# Patient Record
Sex: MALE
[De-identification: names, ages, dates, MRNs, and addresses within clinical notes are randomized per-mention and may not be internally consistent; named-entity substitution may affect disease eponyms.]

---

## 2017-12-27 ENCOUNTER — HOSPITAL ENCOUNTER (OUTPATIENT)
Dept: HOSPITAL 92 - ERS | Age: 82
Setting detail: OBSERVATION
LOS: 1 days | Discharge: HOME | End: 2017-12-28
Attending: FAMILY MEDICINE | Admitting: FAMILY MEDICINE
Payer: MEDICARE

## 2017-12-27 VITALS — BODY MASS INDEX: 25.7 KG/M2

## 2017-12-27 DIAGNOSIS — Z79.82: ICD-10-CM

## 2017-12-27 DIAGNOSIS — E11.9: ICD-10-CM

## 2017-12-27 DIAGNOSIS — Z95.1: ICD-10-CM

## 2017-12-27 DIAGNOSIS — Z88.5: ICD-10-CM

## 2017-12-27 DIAGNOSIS — I25.10: ICD-10-CM

## 2017-12-27 DIAGNOSIS — Z88.6: ICD-10-CM

## 2017-12-27 DIAGNOSIS — Z79.84: ICD-10-CM

## 2017-12-27 DIAGNOSIS — Z79.899: ICD-10-CM

## 2017-12-27 DIAGNOSIS — R07.89: Primary | ICD-10-CM

## 2017-12-27 DIAGNOSIS — Z88.8: ICD-10-CM

## 2017-12-27 DIAGNOSIS — I10: ICD-10-CM

## 2017-12-27 DIAGNOSIS — Z88.1: ICD-10-CM

## 2017-12-27 LAB
ALBUMIN SERPL BCG-MCNC: 4.2 G/DL (ref 3.4–4.8)
ALP SERPL-CCNC: 119 U/L (ref 40–150)
ALT SERPL W P-5'-P-CCNC: 16 U/L (ref 8–55)
ANION GAP SERPL CALC-SCNC: 13 MMOL/L (ref 10–20)
AST SERPL-CCNC: 16 U/L (ref 5–34)
BASOPHILS # BLD AUTO: 0.1 THOU/UL (ref 0–0.2)
BASOPHILS NFR BLD AUTO: 0.7 % (ref 0–1)
BILIRUB SERPL-MCNC: 0.2 MG/DL (ref 0.2–1.2)
BUN SERPL-MCNC: 24 MG/DL (ref 8.4–25.7)
CALCIUM SERPL-MCNC: 9.8 MG/DL (ref 7.8–10.44)
CHLORIDE SERPL-SCNC: 102 MMOL/L (ref 98–107)
CK MB SERPL-MCNC: 1.5 NG/ML (ref 0–6.6)
CO2 SERPL-SCNC: 26 MMOL/L (ref 23–31)
CREAT CL PREDICTED SERPL C-G-VRATE: 0 ML/MIN (ref 70–130)
EOSINOPHIL # BLD AUTO: 0.2 THOU/UL (ref 0–0.7)
EOSINOPHIL NFR BLD AUTO: 2.4 % (ref 0–10)
GLOBULIN SER CALC-MCNC: 3.7 G/DL (ref 2.4–3.5)
GLUCOSE SERPL-MCNC: 188 MG/DL (ref 83–110)
HGB BLD-MCNC: 13.5 G/DL (ref 14–18)
LYMPHOCYTES # BLD: 2.9 THOU/UL (ref 1.2–3.4)
LYMPHOCYTES NFR BLD AUTO: 33 % (ref 21–51)
MCH RBC QN AUTO: 33.1 PG (ref 27–31)
MCV RBC AUTO: 98.1 FL (ref 80–94)
MONOCYTES # BLD AUTO: 1 THOU/UL (ref 0.11–0.59)
MONOCYTES NFR BLD AUTO: 11.7 % (ref 0–10)
NEUTROPHILS # BLD AUTO: 4.5 THOU/UL (ref 1.4–6.5)
NEUTROPHILS NFR BLD AUTO: 52.2 % (ref 42–75)
PLATELET # BLD AUTO: 198 THOU/UL (ref 130–400)
POTASSIUM SERPL-SCNC: 4.2 MMOL/L (ref 3.5–5.1)
RBC # BLD AUTO: 4.09 MILL/UL (ref 4.7–6.1)
SODIUM SERPL-SCNC: 137 MMOL/L (ref 136–145)
TROPONIN I SERPL DL<=0.01 NG/ML-MCNC: (no result) NG/ML (ref ?–0.03)
WBC # BLD AUTO: 8.7 THOU/UL (ref 4.8–10.8)

## 2017-12-27 PROCEDURE — 71020: CPT

## 2017-12-27 PROCEDURE — 93005 ELECTROCARDIOGRAM TRACING: CPT

## 2017-12-27 PROCEDURE — 82962 GLUCOSE BLOOD TEST: CPT

## 2017-12-27 PROCEDURE — 78452 HT MUSCLE IMAGE SPECT MULT: CPT

## 2017-12-27 PROCEDURE — 93017 CV STRESS TEST TRACING ONLY: CPT

## 2017-12-27 PROCEDURE — A9500 TC99M SESTAMIBI: HCPCS

## 2017-12-27 PROCEDURE — 36415 COLL VENOUS BLD VENIPUNCTURE: CPT

## 2017-12-27 PROCEDURE — 80053 COMPREHEN METABOLIC PANEL: CPT

## 2017-12-27 PROCEDURE — 99285 EMERGENCY DEPT VISIT HI MDM: CPT

## 2017-12-27 PROCEDURE — G0378 HOSPITAL OBSERVATION PER HR: HCPCS

## 2017-12-27 PROCEDURE — 85025 COMPLETE CBC W/AUTO DIFF WBC: CPT

## 2017-12-27 PROCEDURE — 82553 CREATINE MB FRACTION: CPT

## 2017-12-27 PROCEDURE — 84484 ASSAY OF TROPONIN QUANT: CPT

## 2017-12-27 PROCEDURE — 36416 COLLJ CAPILLARY BLOOD SPEC: CPT

## 2017-12-27 NOTE — RAD
CHEST TWO VIEWS:

12/27/17

 

HISTORY: 

Chest pain.

 

COMPARISON:  

9/19/17 study. 

 

Heart size is upper limits of normal. Postop sternotomy change. The lungs are clear of any infiltrati
ve process. 

 

IMPRESSION:  

Stable chest. No active intrathoracic disease.

 

POS: [facility]

## 2017-12-28 VITALS — TEMPERATURE: 97.8 F | DIASTOLIC BLOOD PRESSURE: 75 MMHG | SYSTOLIC BLOOD PRESSURE: 142 MMHG

## 2017-12-28 LAB
TROPONIN I SERPL DL<=0.01 NG/ML-MCNC: (no result) NG/ML (ref ?–0.03)
TROPONIN I SERPL DL<=0.01 NG/ML-MCNC: (no result) NG/ML (ref ?–0.03)

## 2017-12-28 NOTE — HP
PRIMARY CARE PROVIDER:  Listed as Dr. Mane Emery.  He cannot remember the name of the doctor, bu
sony that is what is listed.

 

Referred to [facility] by [facility].

 

HISTORY OF PRESENT ILLNESS:  The patient states his blood pressure got high about 159.  He felt a pre
ssure in his chest, lasted about 2 hours, no radiation, no associated symptoms of sweats, shortness o
f breath or nausea.  He presented to the emergency room and was admitted.

 

PAST MEDICAL HISTORY:  Coronary artery disease, diabetes mellitus type 2, hypertension.

 

PAST SURGICAL HISTORY:  Coronary artery bypass graft about 4 years ago, cholecystectomy.

 

ALLERGIES:  ACETAMINOPHEN, AMOXICILLIN, HYDROCODONE, NAPROXEN.

 

SOCIAL HISTORY:  No tobacco or alcohol.

 

CODE STATUS:  FULL CODE status.  Wife at bedside, next of kin and surrogate decision maker.  He is an
 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

 

FAMILY HISTORY:  No coronary artery disease, diabetes or hypertension.

 

CURRENT MEDICATIONS:  Metformin 500 mg a day, lisinopril 5 mg a day, Lipitor 10 mg a day, aspirin 325
 mg a day.

 

REVIEW OF SYSTEMS:  General:  No headaches, dizziness or fainting.  Eyes:  No double vision, blurred 
vision, flashing lights.  ENT:  No ear pain or drainage.  No nasal bleeding.  No trouble swallowing. 
 Cardiac:  No orthopnea or paroxysmal nocturnal dyspnea.  Respirations:  No cough, wheezing or asthma
.  Gastrointestinal:  No nausea, vomiting, diarrhea, constipation, abdominal pain, or melena.  Genito
urinary:  No hematuria, dysuria.  Musculoskeletal:  No pain or swelling in his arms or legs.  Neurolo
gic:  No strokes, seizures or focal weakness.  Psychiatric:  No anxiety, depression.  Skin:  No bruis
es, bleeding or rash.  Heme/Lymph:  No tender or swollen lymph nodes under his arms, neck or his groi
n.  No petechial hemorrhages have been noted.  No bleeding gums.

 

PHYSICAL EXAMINATION:

GENERAL:  He is an alert, pleasant, cooperative gentleman.

VITAL SIGNS:  Blood pressure 143//88, pulse 65-80, respirations 14-16, afebrile, O2 sat 95-98 o
n room air.

HEENT:  Reveal pupils equal, round, and reactive to light.  Extraocular movements are intact.  Sclera
e white.  Tympanic membranes clear.  Nose is clear.  Oral mucous membranes are wet.  Dental hygiene i
s good.

NECK:  Supple, without jugular venous distention, adenopathy or thyromegaly.

CHEST:  Clear to auscultation and percussion.

HEART:  Regular rate and rhythm.  First and second heart sounds clear.  No murmurs, no gallops.

ABDOMEN:  Soft, bowel sounds are normal.  No hepatosplenomegaly, no mass, no rebound, no bruits.

EXTREMITIES:  Reveal no cyanosis, clubbing or edema.

PULSES:  Carotid, radial, femoral, and dorsalis pedis pulses, brisk symmetric.

SKIN:  Warm and dry without bruises or rash.

HEME/LYMPH:  No tender or swollen lymph nodes in axilla, inguinal or cervical area.  No petechial reva
h.

NEUROLOGICAL:  Cranial nerves II through XII are intact.  Moves all extremities.  Sensation is intact
.

 

LABORATORY DATA AND X-RAY FINDINGS:  Comp metabolic profile normal.  Cardiac enzymes normal x3.  Bloo
d sugar 188, 114.  CBC:  Hemoglobin 13.5, white count 8.7, platelet count 198,000.  Chest x-ray revie
wed by me, no cardiomegaly, CHF or infiltrate, post-sternotomy changes.  EKG:  Regular sinus rhythm, 
right bundle branch block, reviewed by me.

 

ADMITTING PROBLEMS:

1.  Chest pain, tightness sensation consistent with angina.

2.  Coronary artery disease, post coronary artery bypass graft.

3.  Hypertension.

4.  Diabetes mellitus type 2 on oral hypoglycemic medicines.

 

PLAN:

1.  Hold metformin.

2.  Exercise Cardiolite stress test.

3.  Monitor blood pressure and Accu-Cheks.

4.  Reevaluate when stress test, etc. is available.

## 2017-12-28 NOTE — NM
CARDIAC SPECT:

 

HISTORY:

An 88-year-old male with chest pain.  Hypertension.  Coronary artery disease.  Diabetes.  CABG.

 

TECHNIQUE:

A myocardial perfusion scan was performed using the single isotope one-day protocol with technetium 9
9m sestamibi, and 9 millicuries was injected intravenously for the rest exam, followed by 30 millicur
ies for the stress study.  Exercise stress was monitored and interpreted by Dr. Gautam.

 

FINDINGS:

Homogeneous tracer distribution is seen in the myocardial segments on stress and rest images without 
fixed or reversible defects.

 

GATED SPECT LVEF:

70%

 

WALL MOTION EXAM:

Normal.

 

IMPRESSION:

Normal myocardial perfusion scan.

 

[facility]: [facility]

## 2017-12-28 NOTE — DIS
DATE OF ADMISSION:  12/28/2017

 

DATE OF DISCHARGE:  12/28/2017

 

DISCHARGE DISPOSITION:  Discharged home.

 

PRIMARY CARE PROVIDER:  Mane Emery D.O.

 

FINAL DIAGNOSES:  Chest pain, noncardiac; coronary artery disease; hypertension, diabetes mellitus ty
pe 2.

 

DISCHARGE MEDICATIONS:  Metformin 500 mg a day, lisinopril 5 mg a day, Lipitor 10 mg a day, aspirin 3
25 mg a day.

 

ALLERGIES:  AMOXICILLIN, HYDROCODONE, NAPROSYN.

 

CODE STATUS:  FULL.

 

PENDING AT THE TIME OF DISCHARGE:  Nothing.

 

HOSPITAL COURSE:  The patient admitted to the hospital through [facility] statin
g his blood pressure got high, he felt some tightness in his chest.  His chest x-ray was unrevealing 
for pneumonia, CHF, etc.  EKG was unrevealing for acute abnormality.  White count 8.7, hemoglobin 13.
5, platelet count 198,000.  Cardiac enzymes normal x3.  Comp metabolic profile normal except for bloo
d sugar of 188.  The patient underwent an exercise Cardiolite stress test which is normal.  The patie
nt currently feels well.  The cardiorespiratory exam is normal.  Vital signs were controlled, blood p
ressure 142/75, pulse 79, respirations 16, O2 sat 96 on room air.  The situation has been discussed w
ith him.  He is being discharged to follow up with Dr. Emery in 7 days.

 

CONSULTATIONS:  None.

 

PROCEDURES:  None.

## 2017-12-30 NOTE — EKG
Test Reason : CHEST PAIN

Blood Pressure : ***/*** mmHG

Vent. Rate : 074 BPM     Atrial Rate : 074 BPM

   P-R Int : 162 ms          QRS Dur : 138 ms

    QT Int : 408 ms       P-R-T Axes : 034 -24 024 degrees

   QTc Int : 452 ms

 

Normal sinus rhythm

Right bundle branch block

Abnormal ECG

 

Confirmed by ANDREW GARCIA ([id number]),  YAW ERWIN ([id number]) on 12/30/2017 1:27:30 PM

 

Referred By:             Confirmed By:ANDREW GARCIA

## 2018-04-06 ENCOUNTER — HOSPITAL ENCOUNTER (EMERGENCY)
Dept: HOSPITAL 92 - ERS | Age: 83
Discharge: HOME | End: 2018-04-06
Payer: MEDICARE

## 2018-04-06 DIAGNOSIS — I10: ICD-10-CM

## 2018-04-06 DIAGNOSIS — S29.011A: Primary | ICD-10-CM

## 2018-04-06 DIAGNOSIS — I25.10: ICD-10-CM

## 2018-04-06 DIAGNOSIS — Z79.84: ICD-10-CM

## 2018-04-06 DIAGNOSIS — Z79.82: ICD-10-CM

## 2018-04-06 DIAGNOSIS — E11.9: ICD-10-CM

## 2018-04-06 DIAGNOSIS — Z79.899: ICD-10-CM

## 2018-04-06 DIAGNOSIS — Z87.891: ICD-10-CM
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
## 2018-04-06 LAB
ALBUMIN SERPL BCG-MCNC: 3.9 G/DL (ref 3.4–4.8)
ALP SERPL-CCNC: 122 U/L (ref 40–150)
ALT SERPL W P-5'-P-CCNC: 14 U/L (ref 8–55)
ANION GAP SERPL CALC-SCNC: 14 MMOL/L (ref 10–20)
AST SERPL-CCNC: 13 U/L (ref 5–34)
BASOPHILS # BLD AUTO: 0 THOU/UL (ref 0–0.2)
BASOPHILS NFR BLD AUTO: 0.1 % (ref 0–1)
BILIRUB SERPL-MCNC: 0.4 MG/DL (ref 0.2–1.2)
BUN SERPL-MCNC: 20 MG/DL (ref 8.4–25.7)
CALCIUM SERPL-MCNC: 9.4 MG/DL (ref 7.8–10.44)
CHLORIDE SERPL-SCNC: 104 MMOL/L (ref 98–107)
CK MB SERPL-MCNC: 1.5 NG/ML (ref 0–6.6)
CK SERPL-CCNC: 74 U/L (ref 30–200)
CO2 SERPL-SCNC: 24 MMOL/L (ref 23–31)
CREAT CL PREDICTED SERPL C-G-VRATE: 0 ML/MIN (ref 70–130)
EOSINOPHIL # BLD AUTO: 0.2 THOU/UL (ref 0–0.7)
EOSINOPHIL NFR BLD AUTO: 1.9 % (ref 0–10)
GLOBULIN SER CALC-MCNC: 2.8 G/DL (ref 2.4–3.5)
GLUCOSE SERPL-MCNC: 182 MG/DL (ref 83–110)
HGB BLD-MCNC: 12.4 G/DL (ref 14–18)
LYMPHOCYTES # BLD: 2.3 THOU/UL (ref 1.2–3.4)
LYMPHOCYTES NFR BLD AUTO: 22.1 % (ref 21–51)
MCH RBC QN AUTO: 33 PG (ref 27–31)
MCV RBC AUTO: 95.5 FL (ref 80–94)
MONOCYTES # BLD AUTO: 0.9 THOU/UL (ref 0.11–0.59)
MONOCYTES NFR BLD AUTO: 8.7 % (ref 0–10)
NEUTROPHILS # BLD AUTO: 7 THOU/UL (ref 1.4–6.5)
NEUTROPHILS NFR BLD AUTO: 67.2 % (ref 42–75)
PLATELET # BLD AUTO: 187 THOU/UL (ref 130–400)
POTASSIUM SERPL-SCNC: 4.6 MMOL/L (ref 3.5–5.1)
RBC # BLD AUTO: 3.75 MILL/UL (ref 4.7–6.1)
SODIUM SERPL-SCNC: 137 MMOL/L (ref 136–145)
TROPONIN I SERPL DL<=0.01 NG/ML-MCNC: (no result) NG/ML (ref ?–0.03)
WBC # BLD AUTO: 10.4 THOU/UL (ref 4.8–10.8)

## 2018-04-06 PROCEDURE — 36415 COLL VENOUS BLD VENIPUNCTURE: CPT

## 2018-04-06 PROCEDURE — 80053 COMPREHEN METABOLIC PANEL: CPT

## 2018-04-06 PROCEDURE — 71046 X-RAY EXAM CHEST 2 VIEWS: CPT

## 2018-04-06 PROCEDURE — 82553 CREATINE MB FRACTION: CPT

## 2018-04-06 PROCEDURE — 85025 COMPLETE CBC W/AUTO DIFF WBC: CPT

## 2018-04-06 PROCEDURE — 93005 ELECTROCARDIOGRAM TRACING: CPT

## 2018-04-06 PROCEDURE — 94760 N-INVAS EAR/PLS OXIMETRY 1: CPT

## 2018-04-06 PROCEDURE — 84484 ASSAY OF TROPONIN QUANT: CPT

## 2018-04-06 NOTE — RAD
CHEST PA AND LATERAL:

 

Date:  04/06/18 

 

HISTORY:  

88-year-old male with history of chest pain, primarily left-sided. 

 

COMPARISON:  

12/27/17. 

 

FINDINGS:

Postop midline sternotomy. Mild stable chronic linear and interstitial changes. No confluent pneumoni
a, overt edema, or pleural effusion. 

 

IMPRESSION: 

Stable minimal bilateral chronic changes. No acute intrathoracic disease. 

 

 

POS: [facility]

## 2018-05-04 ENCOUNTER — HOSPITAL ENCOUNTER (EMERGENCY)
Dept: HOSPITAL 92 - ERS | Age: 83
Discharge: HOME | End: 2018-05-04
Payer: MEDICARE

## 2018-05-04 DIAGNOSIS — Z79.899: ICD-10-CM

## 2018-05-04 DIAGNOSIS — I25.10: ICD-10-CM

## 2018-05-04 DIAGNOSIS — Z79.84: ICD-10-CM

## 2018-05-04 DIAGNOSIS — I10: ICD-10-CM

## 2018-05-04 DIAGNOSIS — Z87.891: ICD-10-CM

## 2018-05-04 DIAGNOSIS — J06.9: Primary | ICD-10-CM

## 2018-05-04 DIAGNOSIS — E11.9: ICD-10-CM

## 2018-05-04 DIAGNOSIS — E78.00: ICD-10-CM

## 2018-05-04 DIAGNOSIS — Z79.82: ICD-10-CM

## 2018-05-04 PROCEDURE — 96372 THER/PROPH/DIAG INJ SC/IM: CPT

## 2018-05-04 PROCEDURE — 87804 INFLUENZA ASSAY W/OPTIC: CPT

## 2018-05-04 PROCEDURE — 71046 X-RAY EXAM CHEST 2 VIEWS: CPT

## 2018-05-04 NOTE — RAD
PA AND LATERAL VIEWS CHEST:

 

HISTORY: 

Cough.

 

COMPARISON: 

Comparison is made with the exam of 4/6/18.

 

FINDINGS: 

Postop changes of median sternotomy are again seen.  The heart size is normal.  Chronic changes are r
edemonstrated in the lung fields.  No lobar consolidation, pneumothoraces, or pleural effusions are i
dentified.  There are degenerative changes in the spine.

 

IMPRESSION: 

No acute process.

 

POS: [facility]

## 2018-05-13 ENCOUNTER — HOSPITAL ENCOUNTER (EMERGENCY)
Dept: HOSPITAL 92 - ERS | Age: 83
Discharge: HOME | End: 2018-05-13
Payer: MEDICARE

## 2018-05-13 DIAGNOSIS — E11.9: ICD-10-CM

## 2018-05-13 DIAGNOSIS — Z87.891: ICD-10-CM

## 2018-05-13 DIAGNOSIS — I10: ICD-10-CM

## 2018-05-13 DIAGNOSIS — I25.10: ICD-10-CM

## 2018-05-13 DIAGNOSIS — J40: Primary | ICD-10-CM

## 2018-05-13 PROCEDURE — 71046 X-RAY EXAM CHEST 2 VIEWS: CPT

## 2018-05-13 PROCEDURE — 96372 THER/PROPH/DIAG INJ SC/IM: CPT

## 2018-05-13 NOTE — RAD
PA AND LATERAL CHEST:

 

Date:  05/13/18 

 

HISTORY:  

Cough and congestion for several weeks. 

 

COMPARISON:  

05/04/18. 

 

FINDINGS:

Postsurgical changes related to CABG are again noted. The cardiac silhouette is at the upper limits o
f normal but stable in size. Pulmonary vasculature is within normal limits. Mild linear densities are
 again seen at each lung base, probably related to superimposition of structures and vasculature. Rosa
gs are otherwise clear. Degenerative changes seen in the spine. Surgical clips overlie the right uppe
r quadrant. 

 

IMPRESSION: 

No acute cardiopulmonary process. 

 

 

POS: [facility]

## 2018-05-20 ENCOUNTER — HOSPITAL ENCOUNTER (EMERGENCY)
Dept: HOSPITAL 92 - ERS | Age: 83
Discharge: HOME | End: 2018-05-20
Payer: MEDICARE

## 2018-05-20 DIAGNOSIS — I25.10: ICD-10-CM

## 2018-05-20 DIAGNOSIS — I10: ICD-10-CM

## 2018-05-20 DIAGNOSIS — J06.9: Primary | ICD-10-CM

## 2018-05-20 DIAGNOSIS — Z79.82: ICD-10-CM

## 2018-05-20 DIAGNOSIS — Z79.899: ICD-10-CM

## 2018-05-20 DIAGNOSIS — Z79.84: ICD-10-CM

## 2018-05-20 DIAGNOSIS — Z87.891: ICD-10-CM

## 2018-05-20 DIAGNOSIS — E78.5: ICD-10-CM

## 2018-05-20 DIAGNOSIS — E11.9: ICD-10-CM

## 2018-05-20 LAB
ALBUMIN SERPL BCG-MCNC: 3.7 G/DL (ref 3.4–4.8)
ALP SERPL-CCNC: 85 U/L (ref 40–150)
ALT SERPL W P-5'-P-CCNC: 15 U/L (ref 8–55)
ANION GAP SERPL CALC-SCNC: 9 MMOL/L (ref 10–20)
AST SERPL-CCNC: 12 U/L (ref 5–34)
BASOPHILS # BLD AUTO: 0.1 THOU/UL (ref 0–0.2)
BASOPHILS NFR BLD AUTO: 0.8 % (ref 0–1)
BILIRUB SERPL-MCNC: 0.6 MG/DL (ref 0.2–1.2)
BUN SERPL-MCNC: 34 MG/DL (ref 8.4–25.7)
CALCIUM SERPL-MCNC: 9.5 MG/DL (ref 7.8–10.44)
CHLORIDE SERPL-SCNC: 103 MMOL/L (ref 98–107)
CK MB SERPL-MCNC: 1.1 NG/ML (ref 0–6.6)
CK SERPL-CCNC: 35 U/L (ref 30–200)
CO2 SERPL-SCNC: 26 MMOL/L (ref 23–31)
CREAT CL PREDICTED SERPL C-G-VRATE: 0 ML/MIN (ref 70–130)
EOSINOPHIL # BLD AUTO: 0.3 THOU/UL (ref 0–0.7)
EOSINOPHIL NFR BLD AUTO: 2.3 % (ref 0–10)
GLOBULIN SER CALC-MCNC: 2.9 G/DL (ref 2.4–3.5)
GLUCOSE SERPL-MCNC: 215 MG/DL (ref 83–110)
HGB BLD-MCNC: 13.3 G/DL (ref 14–18)
LYMPHOCYTES # BLD: 3.2 THOU/UL (ref 1.2–3.4)
LYMPHOCYTES NFR BLD AUTO: 26.1 % (ref 21–51)
MCH RBC QN AUTO: 33 PG (ref 27–31)
MCV RBC AUTO: 95.5 FL (ref 80–94)
MONOCYTES # BLD AUTO: 0.9 THOU/UL (ref 0.11–0.59)
MONOCYTES NFR BLD AUTO: 7.6 % (ref 0–10)
NEUTROPHILS # BLD AUTO: 7.7 THOU/UL (ref 1.4–6.5)
NEUTROPHILS NFR BLD AUTO: 63.3 % (ref 42–75)
PLATELET # BLD AUTO: 254 THOU/UL (ref 130–400)
POTASSIUM SERPL-SCNC: 4.4 MMOL/L (ref 3.5–5.1)
RBC # BLD AUTO: 4.03 MILL/UL (ref 4.7–6.1)
SODIUM SERPL-SCNC: 134 MMOL/L (ref 136–145)
TROPONIN I SERPL DL<=0.01 NG/ML-MCNC: (no result) NG/ML (ref ?–0.03)
WBC # BLD AUTO: 12.2 THOU/UL (ref 4.8–10.8)

## 2018-05-20 PROCEDURE — 80053 COMPREHEN METABOLIC PANEL: CPT

## 2018-05-20 PROCEDURE — 93005 ELECTROCARDIOGRAM TRACING: CPT

## 2018-05-20 PROCEDURE — 71045 X-RAY EXAM CHEST 1 VIEW: CPT

## 2018-05-20 PROCEDURE — 82553 CREATINE MB FRACTION: CPT

## 2018-05-20 PROCEDURE — 36415 COLL VENOUS BLD VENIPUNCTURE: CPT

## 2018-05-20 PROCEDURE — 84484 ASSAY OF TROPONIN QUANT: CPT

## 2018-05-20 PROCEDURE — 85025 COMPLETE CBC W/AUTO DIFF WBC: CPT

## 2018-05-20 NOTE — RAD
PORTABLE CHEST:

 

Date:  05/20/18 

 

HISTORY:  

Shortness of breath. 

 

COMPARISON:  

03/13/16 study. 

 

FINDINGS:

Heart size is within normal limits. There are postop sternotomy changes. Lungs are clear of infiltrat
es. Mild chronic changes are seen. No focal infiltrates. 

 

IMPRESSION: 

No active intrathoracic disease. 

 

 

POS: [facility]

## 2019-01-27 ENCOUNTER — HOSPITAL ENCOUNTER (EMERGENCY)
Dept: HOSPITAL 92 - ERS | Age: 84
Discharge: HOME | End: 2019-01-27
Payer: MEDICARE

## 2019-01-27 DIAGNOSIS — Z79.84: ICD-10-CM

## 2019-01-27 DIAGNOSIS — Z79.82: ICD-10-CM

## 2019-01-27 DIAGNOSIS — E11.9: ICD-10-CM

## 2019-01-27 DIAGNOSIS — Z87.891: ICD-10-CM

## 2019-01-27 DIAGNOSIS — I25.10: ICD-10-CM

## 2019-01-27 DIAGNOSIS — R05: Primary | ICD-10-CM

## 2019-01-27 DIAGNOSIS — Z79.899: ICD-10-CM

## 2019-01-27 DIAGNOSIS — I10: ICD-10-CM

## 2019-01-27 PROCEDURE — 71046 X-RAY EXAM CHEST 2 VIEWS: CPT

## 2019-01-27 NOTE — RAD
RADIOGRAPH CHEST 2 VIEWS:

 

HISTORY: 

An 89-year-old male with cough for 3 days.

 

FINDINGS:

There is no air space density, pulmonary edema, pleural effusion, pneumothorax, or cardiomegaly.

 

IMPRESSION: 

No acute cardiopulmonary findings.

 

 

jn []

 

POS: [facility]

## 2019-06-28 ENCOUNTER — HOSPITAL ENCOUNTER (OUTPATIENT)
Dept: HOSPITAL 92 - ERS | Age: 84
Setting detail: OBSERVATION
LOS: 1 days | Discharge: HOME | End: 2019-06-29
Attending: INTERNAL MEDICINE | Admitting: INTERNAL MEDICINE
Payer: MEDICARE

## 2019-06-28 VITALS — BODY MASS INDEX: 24.8 KG/M2

## 2019-06-28 DIAGNOSIS — E86.0: Primary | ICD-10-CM

## 2019-06-28 DIAGNOSIS — I25.10: ICD-10-CM

## 2019-06-28 DIAGNOSIS — Z88.0: ICD-10-CM

## 2019-06-28 DIAGNOSIS — E11.9: ICD-10-CM

## 2019-06-28 DIAGNOSIS — Z95.1: ICD-10-CM

## 2019-06-28 DIAGNOSIS — I10: ICD-10-CM

## 2019-06-28 DIAGNOSIS — Z88.6: ICD-10-CM

## 2019-06-28 DIAGNOSIS — Z79.82: ICD-10-CM

## 2019-06-28 DIAGNOSIS — E78.5: ICD-10-CM

## 2019-06-28 DIAGNOSIS — Z88.5: ICD-10-CM

## 2019-06-28 DIAGNOSIS — Z79.899: ICD-10-CM

## 2019-06-28 DIAGNOSIS — N17.9: ICD-10-CM

## 2019-06-28 DIAGNOSIS — Z79.84: ICD-10-CM

## 2019-06-28 DIAGNOSIS — Z87.891: ICD-10-CM

## 2019-06-28 LAB
ALBUMIN SERPL BCG-MCNC: 4.7 G/DL (ref 3.4–4.8)
ALP SERPL-CCNC: 86 U/L (ref 40–150)
ALT SERPL W P-5'-P-CCNC: 19 U/L (ref 8–55)
ANION GAP SERPL CALC-SCNC: 18 MMOL/L (ref 10–20)
AST SERPL-CCNC: 14 U/L (ref 5–34)
BASOPHILS # BLD AUTO: 0.1 THOU/UL (ref 0–0.2)
BASOPHILS NFR BLD AUTO: 0.4 % (ref 0–1)
BILIRUB SERPL-MCNC: 0.5 MG/DL (ref 0.2–1.2)
BUN SERPL-MCNC: 47 MG/DL (ref 8.4–25.7)
CALCIUM SERPL-MCNC: 10.7 MG/DL (ref 7.8–10.44)
CHLORIDE SERPL-SCNC: 102 MMOL/L (ref 98–107)
CK SERPL-CCNC: 149 U/L (ref 30–200)
CO2 SERPL-SCNC: 21 MMOL/L (ref 23–31)
CREAT CL PREDICTED SERPL C-G-VRATE: 0 ML/MIN (ref 70–130)
EOSINOPHIL # BLD AUTO: 0.1 THOU/UL (ref 0–0.7)
EOSINOPHIL NFR BLD AUTO: 0.7 % (ref 0–10)
GLOBULIN SER CALC-MCNC: 3.4 G/DL (ref 2.4–3.5)
GLUCOSE SERPL-MCNC: 146 MG/DL (ref 83–110)
HGB BLD-MCNC: 13.2 G/DL (ref 14–18)
LYMPHOCYTES # BLD: 2.4 THOU/UL (ref 1.2–3.4)
LYMPHOCYTES NFR BLD AUTO: 17.5 % (ref 21–51)
MCH RBC QN AUTO: 32 PG (ref 27–31)
MCV RBC AUTO: 95.4 FL (ref 78–98)
MONOCYTES # BLD AUTO: 1.6 THOU/UL (ref 0.11–0.59)
MONOCYTES NFR BLD AUTO: 12 % (ref 0–10)
NEUTROPHILS # BLD AUTO: 9.4 THOU/UL (ref 1.4–6.5)
NEUTROPHILS NFR BLD AUTO: 69.4 % (ref 42–75)
PLATELET # BLD AUTO: 212 THOU/UL (ref 130–400)
POTASSIUM SERPL-SCNC: 4.6 MMOL/L (ref 3.5–5.1)
RBC # BLD AUTO: 4.13 MILL/UL (ref 4.7–6.1)
SODIUM SERPL-SCNC: 136 MMOL/L (ref 136–145)
WBC # BLD AUTO: 13.5 THOU/UL (ref 4.8–10.8)

## 2019-06-28 PROCEDURE — 80048 BASIC METABOLIC PNL TOTAL CA: CPT

## 2019-06-28 PROCEDURE — 80053 COMPREHEN METABOLIC PANEL: CPT

## 2019-06-28 PROCEDURE — 83605 ASSAY OF LACTIC ACID: CPT

## 2019-06-28 PROCEDURE — 85025 COMPLETE CBC W/AUTO DIFF WBC: CPT

## 2019-06-28 PROCEDURE — 96361 HYDRATE IV INFUSION ADD-ON: CPT

## 2019-06-28 PROCEDURE — 96360 HYDRATION IV INFUSION INIT: CPT

## 2019-06-28 PROCEDURE — G0378 HOSPITAL OBSERVATION PER HR: HCPCS

## 2019-06-28 PROCEDURE — 83735 ASSAY OF MAGNESIUM: CPT

## 2019-06-28 PROCEDURE — 82550 ASSAY OF CK (CPK): CPT

## 2019-06-28 PROCEDURE — 36415 COLL VENOUS BLD VENIPUNCTURE: CPT

## 2019-06-28 PROCEDURE — 81001 URINALYSIS AUTO W/SCOPE: CPT

## 2019-06-28 PROCEDURE — 99284 EMERGENCY DEPT VISIT MOD MDM: CPT

## 2019-06-29 VITALS — SYSTOLIC BLOOD PRESSURE: 114 MMHG | TEMPERATURE: 97.6 F | DIASTOLIC BLOOD PRESSURE: 69 MMHG

## 2019-06-29 LAB
ANION GAP SERPL CALC-SCNC: 14 MMOL/L (ref 10–20)
BUN SERPL-MCNC: 41 MG/DL (ref 8.4–25.7)
CALCIUM SERPL-MCNC: 9.6 MG/DL (ref 7.8–10.44)
CHLORIDE SERPL-SCNC: 106 MMOL/L (ref 98–107)
CO2 SERPL-SCNC: 21 MMOL/L (ref 23–31)
CREAT CL PREDICTED SERPL C-G-VRATE: 28 ML/MIN (ref 70–130)
CRYSTAL-AUWI FLAG: 0.1 (ref 0–15)
GLUCOSE SERPL-MCNC: 117 MG/DL (ref 83–110)
HEV IGM SER QL: 1.3 (ref 0–7.99)
HYALINE CASTS #/AREA URNS LPF: (no result) LPF
PATHC CAST-AUWI FLAG: 1.08 (ref 0–2.49)
POTASSIUM SERPL-SCNC: 4.2 MMOL/L (ref 3.5–5.1)
RBC UR QL AUTO: (no result) HPF (ref 0–3)
SODIUM SERPL-SCNC: 137 MMOL/L (ref 136–145)
SPERM-AUWI FLAG: 0 (ref 0–9.9)
YEAST-AUWI FLAG: 0 (ref 0–25)
